# Patient Record
(demographics unavailable — no encounter records)

---

## 2024-12-18 NOTE — HISTORY OF PRESENT ILLNESS
[FreeTextEntry1] : LMP:  HPI: Pt is a 30 y/o here for a consultation on heavy vaginal bleeding. Pt was admitted to [facility] in 2024 for symptomatic anemia. Patient has received iron infusions in the past.  Pelvic sono MRI Pelvis EMBx Lab: 2024: RBC 2.81 Hemoglobin: 8.7 Hematocrti 25.9  Health maintenance: Last pap: HPV:  PObHx: C/S x2 GynHx: Bicornuate uterus, right ovarian cyst, Nexplanon () PMH: Thalassemia followed by [facility] (Dr. Cartagena) PSH:  x 2, Shoulder sx  All: NKDA Meds: Vitamin B 12 1000 mcg daily, Estarylla 1 tablet daily, Pantoprazol 40 mg daily, Senna 2 tablets FamHx: Father: Anemia Social Hx: